# Patient Record
(demographics unavailable — no encounter records)

---

## 2025-07-23 NOTE — PHYSICAL EXAM
[Normocephalic] : normocephalic [Atraumatic] : atraumatic [Supple] : supple [No Supraclavicular Adenopathy] : no supraclavicular adenopathy [Examined in the supine and seated position] : examined in the supine and seated position [No dominant masses] : no dominant masses in right breast  [No Nipple Retraction] : no left nipple retraction [No Nipple Discharge] : no left nipple discharge [No Axillary Lymphadenopathy] : no left axillary lymphadenopathy [No Edema] : no edema [No Rashes] : no rashes [No Ulceration] : no ulceration [Symmetrical] : symmetrical [de-identified] : 1:00 N4 there is a 2cm mobile mass c/w ultrasound findings

## 2025-07-23 NOTE — ASSESSMENT
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 And maintaining a normal vitamin D level and stress management strategies  plan mg/sono DEC 2025 then follow up with me She knows to call or return sooner should any concerns or questions arise.

## 2025-07-23 NOTE — REVIEW OF SYSTEMS
[Night Sweats] : night sweats [Lethargy] : lethargy [Recent ___ Lb Weight Gain] : recent [unfilled] ~Ulb weight gain [Earache] : earache [Sore Throat] : sore throat [Hoarseness] : hoarseness [Heartburn] : heartburn [Breast Pain] : breast pain [Loss of Hair] : loss of hair [Feelings Of Weakness] : feelings of weakness [Negative] : Heme/Lymph [Loss Of Hearing] : no hearing loss [Nosebleeds] : no nosebleeds [Nasal Discharge] : no nasal discharge [Abdominal Pain] : no abdominal pain [Vomiting] : no vomiting [Constipation] : no constipation [Diarrhea] : no diarrhea [Melena] : no melena [Skin Lesions] : no skin lesions [Skin Wound] : no skin wound [Itching] : no itching [Change In A Mole] : no change in a mole [Breast Lump] : no breast lump [Proptosis] : no proptosis [Hot Flashes] : no hot flashes [Muscle Weakness] : no muscle weakness [Deepening Of The Voice] : no deepening of the voice

## 2025-07-23 NOTE — HISTORY OF PRESENT ILLNESS
[FreeTextEntry1] : Sindy is a 49-year-old female referred for a biopsy proven fibroepithelial lesion of the left breast.  Sindy's screening mammogram and ultrasound for density (11/17/2024, [facility]) showed heterogeneously dense breast tissue with bilateral waxing and waning nodularity on mammogram. Ultrasound findings showed new probably benign hypoechoic lesions in both breast (Rt 4:00 & 5:00, Lt 10:00) for which short term f/u targeted ultrasound was recommended. A new left 12-1:00 2 cm hypoechoic lesion was also seen on ultrasound for which a left ultrasound guided biopsy was recommended and done on 12/17/2024 [facility]).  Pathology of the left 1:00 N4 (bowtie-shaped clip) ultrasound biopsy showed fibroepithelial lesion with stromal hypercellularity. The differential diagnosis includes fibroadenoma vs phyllodes. A surgical consult was recommended and Sindy previously consulted with Dr Hubbard ([facility]). Surgical excision was advised with the option of waiting until after her recommended 6 month follow up imaging> Sindy elected to proceed with surgery but has not had to date.  Her short-term follow-up bilateral breast ultrasound (06/06/2025, [facility]) showed the left 12:00 N1 preciously biopsied fibroepithelial lesion has questionably slightly increased in size to 2.4 cm.  Surgical excision is again advised. The additional bilateral nodules are either decreased in size or stable and a 6m f/u ultrasound was recommended at time of Sindy's annual imaging.  Sindy's previous breast history is positive for a right 4:00 ultrasound biopsy in 7/2023. Pathology showed fibroadenoma and multiple cyst aspirations. for fibrocystic disease.   She does SBE. She has not noticed a change in her breast or a breast lump. She has not noticed a change in her nipple or nipple area. She has not noticed a change in the skin of the breast. She is not experiencing nipple discharge. She is experiencing breast pain, left breast pressure. She has not noticed a lump or lymph node under the armpit.   BREAST CANCER RISK FACTORS Menarche: 15 LMP: unsure (Mirena)  Menopause: pre  Grav: 3  Para: 4  Age at first live birth: 31  Nursed: yes  Hysterectomy: no  Oophorectomy: no  OCP: for 40 years  HRT: no  Last pap/pelvic exam: 11/2024 WNL  Related family history:  denies breast, ovarian, melanoma, pancreatic Ashkenazi: no  Tyrer Gailzick v8 risk assessment: 12% Genetic testing: no  Bra size: 34C   Last mammogram: 11/15/2024             Location: [facility]  Report reviewed.     Images reviewed on PACS Results: BIRADS 0 Incomplete needs additional imaging  The breasts are heterogeneously dense, waxing and waning nodularity    Last ultrasound: 06/06/2025                 Location: [facility]  Report reviewed.     Images reviewed on PACS Results: BIRADS 4  Question slight increased size of 2.4 cm mass in the LEFT breast @12:00 1 cm FN previously biopsied yielding fibroepithelial lesion with stromal hypercellularity. Additional probable benign bilateral breast nodularity    Last MRI:  never    Location: Report reviewed.

## 2025-07-23 NOTE — ASSESSMENT
[FreeTextEntry1] : 50 yo female with left breast borderline findings of phyllodes and right fibroadenoma  reviewed her risk status, she is not high risk she has observed the FEL lesion and would like to continue to do so pathology copy given for both biopsies  We reviewed risk reduction strategies including maintaining a BMI <25, limiting red meat intake and alcoholic beverages to 3 per week and exercise (150 min/ week low intensity or 75 min/week high intensity). And maintaining a normal vitamin D level and stress management strategies  plan mg/sono DEC 2025 then follow up with me She knows to call or return sooner should any concerns or questions arise.

## 2025-07-23 NOTE — CONSULT LETTER
[Dear  ___] : Dear  [unfilled], [( Thank you for referring [unfilled] for consultation for _____ )] : Thank you for referring [unfilled] for consultation for [unfilled] [Please see my note below.] : Please see my note below. [Consult Closing:] : Thank you very much for allowing me to participate in the care of this patient.  If you have any questions, please do not hesitate to contact me. [Sincerely,] : Sincerely, [FreeTextEntry3] : Chio [place]ares [place] [address]

## 2025-07-23 NOTE — CONSULT LETTER
[Dear  ___] : Dear  [unfilled], [( Thank you for referring [unfilled] for consultation for _____ )] : Thank you for referring [unfilled] for consultation for [unfilled] [Please see my note below.] : Please see my note below. [Consult Closing:] : Thank you very much for allowing me to participate in the care of this patient.  If you have any questions, please do not hesitate to contact me. [Sincerely,] : Sincerely, [FreeTextEntry3] : Chio Cazares MS [place] [address]